# Patient Record
Sex: FEMALE | Race: WHITE | Employment: FULL TIME | URBAN - METROPOLITAN AREA
[De-identification: names, ages, dates, MRNs, and addresses within clinical notes are randomized per-mention and may not be internally consistent; named-entity substitution may affect disease eponyms.]

---

## 2021-03-11 ENCOUNTER — VIRTUAL VISIT (OUTPATIENT)
Dept: PULMONOLOGY | Age: 46
End: 2021-03-11
Payer: COMMERCIAL

## 2021-03-11 DIAGNOSIS — F41.1 GAD (GENERALIZED ANXIETY DISORDER): Chronic | ICD-10-CM

## 2021-03-11 DIAGNOSIS — G47.10 HYPERSOMNIA: Primary | ICD-10-CM

## 2021-03-11 DIAGNOSIS — F51.04 CHRONIC INSOMNIA: ICD-10-CM

## 2021-03-11 DIAGNOSIS — R06.83 SNORING: ICD-10-CM

## 2021-03-11 PROCEDURE — G8427 DOCREV CUR MEDS BY ELIG CLIN: HCPCS | Performed by: INTERNAL MEDICINE

## 2021-03-11 PROCEDURE — 99204 OFFICE O/P NEW MOD 45 MIN: CPT | Performed by: INTERNAL MEDICINE

## 2021-03-11 RX ORDER — CITALOPRAM 40 MG/1
TABLET ORAL
COMMUNITY
Start: 2021-02-24

## 2021-03-11 RX ORDER — ZOLPIDEM TARTRATE 10 MG/1
TABLET ORAL
COMMUNITY
Start: 2021-01-25

## 2021-03-11 ASSESSMENT — SLEEP AND FATIGUE QUESTIONNAIRES
HOW LIKELY ARE YOU TO NOD OFF OR FALL ASLEEP WHILE SITTING INACTIVE IN A PUBLIC PLACE: 0
HOW LIKELY ARE YOU TO NOD OFF OR FALL ASLEEP WHILE SITTING AND READING: 1
HOW LIKELY ARE YOU TO NOD OFF OR FALL ASLEEP WHEN YOU ARE A PASSENGER IN A CAR FOR AN HOUR WITHOUT A BREAK: 1
HOW LIKELY ARE YOU TO NOD OFF OR FALL ASLEEP WHILE SITTING QUIETLY AFTER LUNCH WITHOUT ALCOHOL: 1
HOW LIKELY ARE YOU TO NOD OFF OR FALL ASLEEP WHILE LYING DOWN TO REST IN THE AFTERNOON WHEN CIRCUMSTANCES PERMIT: 3

## 2021-03-11 NOTE — LETTER
[facility]  [place] [address] [place] [address]  Phone: [phone]  Fax: [phone]      March 11, 2021       Patient: Leonor Olivares   MR Number: [id number]   YOB: 1975   Date of Visit: 3/11/2021     Thank you for allowing me to participate in the care of Marilin Mccullough. Here is my assessment and plan. Also attached is a copy of her consult note:    ASSESSMENT:  Visit Diagnoses and Associated Orders     Hypersomnia   (New Problem)  -  Primary    needs work-up    POLYSOMNOGRAPHY (PSG) - Diagnostic Testing [77673 Custom]   - Future Order         Snoring   (New Problem)      needs work-up    POLYSOMNOGRAPHY (PSG) - Diagnostic Testing [95372 Custom]   - Future Order         Chronic insomnia   (New Problem)      Needs work up    zolpidem (AMBIEN) 10 MG tablet [[id number]]           ANGELA (generalized anxiety disorder)   (Stable)      citalopram (CELEXA) 40 MG tablet [[id number]]                 Plan:  One or more undiagnosed new problem with uncertain prognosis till final diagnosis is made. Differential diagnosis includes but not limited to: DARCY, PLMD's, narcolepsy, parasomnias. Reviewed DARCY (which is the highest likelihood diagnosis): pathophysiology, diagnosis, complications and treatment. Instructed her not to drive if drowsy. Continue medications per her PCP and other physicians. Limit caffeine use after 3pm. Will do PSG to rule-out DARCY and other sleep disorders. 1 wk follow up after study to review her results. The chronic medical conditions listed are directly related to the primary diagnosis listed above. The management of the primary diagnosis affects the secondary diagnosis and vice versa. Discussed needing work up of obstructive sleep apnea prior to adding other possible meds. May consider CBT-I. Reviewed PDMP, confirmed zolpidem Rx. Discussed the risk of taking zolpidem with possible untreated sleep disordered breathing.     Discussed moving celexa dose to am, may need to consider more sedating med if obstructive sleep apnea work up is negative. Could also consider trial belsomar but will wait till polysomnography results are in. This information was analyzed to assess complexity and medical decision making in regards to further testing and management. Orders Placed This Encounter   Procedures    POLYSOMNOGRAPHY (PSG) - Diagnostic Testing         If you have questions or concerns, please do not hesitate to call me. I look forward to following Kimmie along with you.     Sincerely,      Raffaele Rossi MD    [facility] providers:  Daiana Howell MD  [address] [address]  Via Fax: [phone]

## 2021-03-11 NOTE — PROGRESS NOTES
[place], Tay [place], [facility]  Tiffanie Kehrt CNP [address]  [address] [facility], [address] [facility] [phone]   [facility] [address]. [address] [facility]. [address] [phone]     Video Visit- Consult    Pursuant to the emergency declaration under the [address] [place] [address], [facility] authority and the [employer] Act, this Virtual  Visit was conducted, with patient's consent, to reduce the patient's risk of exposure to COVID-19. Services were provided through a video synchronous discussion virtually to substitute for in-person clinic visit. Patient was located in their home. Assessment:      Visit Diagnoses and Associated Orders     Hypersomnia   (New Problem)  -  Primary    needs work-up    POLYSOMNOGRAPHY (PSG) - Diagnostic Testing [03427 Custom]   - Future Order         Snoring   (New Problem)      needs work-up    POLYSOMNOGRAPHY (PSG) - Diagnostic Testing [[id number] Custom]   - Future Order         Chronic insomnia   (New Problem)      Needs work up    zolpidem (AMBIEN) 10 MG tablet [08082]           ANGELA (generalized anxiety disorder)   (Stable)      citalopram (CELEXA) 40 MG tablet [[id number]]                  Plan:      One or more undiagnosed new problem with uncertain prognosis till final diagnosis is made. Differential diagnosis includes but not limited to: DARCY, PLMD's, narcolepsy, parasomnias. Reviewed DARCY (which is the highest likelihood diagnosis): pathophysiology, diagnosis, complications and treatment. Instructed her not to drive if drowsy. Continue medications per her PCP and other physicians. Limit caffeine use after 3pm. Will do PSG to rule-out DARCY and other sleep disorders. 1 wk follow up after study to review her results. The chronic medical conditions listed are directly related to the primary diagnosis listed above.   The management of the primary diagnosis affects the secondary diagnosis and vice versa. Discussed needing work up of obstructive sleep apnea prior to adding other possible meds. May consider CBT-I. Reviewed PDMP, confirmed zolpidem Rx. Discussed the risk of taking zolpidem with possible untreated sleep disordered breathing. Discussed moving celexa dose to am, may need to consider more sedating med if obstructive sleep apnea work up is negative. Could also consider trial belsomar but will wait till polysomnography results are in. This information was analyzed to assess complexity and medical decision making in regards to further testing and management. Orders Placed This Encounter   Procedures    POLYSOMNOGRAPHY (PSG) - Diagnostic Testing          Subjective:     Patient ID: Luz Wolf is a 54 y.o. female. Chief Complaint   Patient presents with    Insomnia    Daytime Sleepiness       HPI:      Luz Wolf is a 54 y.o. female self-referred for a sleep evaluation. She complains of: snoring, excessive daytime sleepiness , non-restorative sleep, napping, tossing and turning at night and night sweats. She denies: cataplexy and hypnagogic hallucinations. Has trouble falling and maintaining sleep. Feels she has always had issues sleeping. Attempting bed 11 P, is tired at that time. Gets on her phone prior to bed. At times take 2-3 hrs to fall asleep or she will fall asleep but not be able to stay asleep. Gets out of bed at 6:45 AM.  Has tried melatonin and other OTC sleep aids which has not helped. 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 Legs don't bother her falling asleep. If she cannot fall asleep for hours will take 5 mg zolpidem which will help most of the time. Has been on ambien 10 mg in the past which helped her to sleep but was worried she was sleeping to deep.     Previous evaluation and treatment has included- None / Independent interpretation of the test (sleep study) by another, external physician shows:     DOT/CDL - No  FAA/'s license -No    Previous Report(s) Reviewed: historical medical records, office notes, and referral letter(s). Pertinent data has been documented. [place] - Total score: 7    Caffeine Intake - Hot tea: 2 cup(s) per day    Social History     Socioeconomic History    Marital status:      Spouse name: Not on file    Number of children: Not on file    Years of education: Not on file    Highest education level: Not on file   Occupational History    Not on file   Social Needs    Financial resource strain: Not on file    Food insecurity     Worry: Not on file     Inability: Not on file    Transportation needs     Medical: Not on file     Non-medical: Not on file   Tobacco Use    Smoking status: Never Smoker    Smokeless tobacco: Never Used   Substance and Sexual Activity    Alcohol use: No    Drug use: No    Sexual activity: Yes     Partners: Male   Lifestyle    Physical activity     Days per week: Not on file     Minutes per session: Not on file    Stress: Not on file   Relationships    Social connections     Talks on phone: Not on file     Gets together: Not on file     Attends Mandaeism service: Not on file     Active member of club or organization: Not on file     Attends meetings of clubs or organizations: Not on file     Relationship status: Not on file    Intimate partner violence     Fear of current or ex partner: Not on file     Emotionally abused: Not on file     Physically abused: Not on file     Forced sexual activity: Not on file   Other Topics Concern    Not on file   Social History Narrative    Not on file        Current Outpatient Medications   Medication Sig Dispense Refill    zolpidem (AMBIEN) 10 MG tablet       citalopram (CELEXA) 40 MG tablet TAKE 1 TABLET BY MOUTH DAILY      Prenatal Vit-Fe Fumarate-FA (PRENATAL MULTIVITAMIN) 27-1 MG TABS tablet Take 1 tablet by mouth daily.        No current facility-administered medications for this visit. Allergies as of 03/11/2021    (No Known Allergies)       Patient Active Problem List   Diagnosis    Chronic insomnia    ANGELA (generalized anxiety disorder)       Past Medical History:   Diagnosis Date    Abnormal glandular Papanicolaou smear of cervix     leep 1995,    Anemia     ANGELA (generalized anxiety disorder) 3/11/2021    Mental disorder     anxiety in past, medicated withCelexa    Varicosities     right leg, labia right       Family History   Adopted: Yes       Objective:     Vitals:  Patient reported Height and Weight Calculated BMI   Patient-Reported Vitals 3/11/2021   Patient-Reported Weight 150#   Patient-Reported Height 5'3\"       26.6     Due to COVID-19 this was a virtual visit and physical exam was deferred.     Electronically signed by Arely Argueta MD on3/11/2021 at 3:50 PM

## 2021-03-15 ENCOUNTER — TELEPHONE (OUTPATIENT)
Dept: SLEEP CENTER | Age: 46
End: 2021-03-15

## 2021-03-15 NOTE — TELEPHONE ENCOUNTER
Called to schedule psg per Luis Knutson. Left vm for the pt to return my call.      [employer]